# Patient Record
(demographics unavailable — no encounter records)

---

## 2024-12-03 NOTE — END OF VISIT
[FreeTextEntry3] : I personally saw and examined BENJA RODRIGES  in detail. I spoke to MICHELE Felix regarding the assessment and plan of care. I performed the procedures and relevant physical exam. I have made changes to the body of the note wherever necessary and appropriate.

## 2024-12-03 NOTE — HISTORY OF PRESENT ILLNESS
[de-identified] : Mr. RODRIGES is a 17 year male here with dad, was elbowed yesterday during a soccer game, he did hear a crack and nose bled. he was seen at [facility] no imaging performed. he is congested on R side  prior nasal injury 2 years ago, told he has a nasal fracture on imaging but nose did not appear crooked at that time and he had no congestion  - s/p ORNF and septoplasty with bilateral splints 12/2/24

## 2024-12-03 NOTE — ASSESSMENT
[FreeTextEntry1] : Mr. RODRIGES s/p ORNF and septoplasty with bilateral splints 12/2/24 POD1, doing well.   - can start small squirts of nasal saline spray today and continue TID followed by peroxide cleaning and bacitracin - pt shown how to clean with q-tips and peroxide followed by bacitracin, written instruction provided - nasal tip dressing re applied, pt can stop using once no longer necessary - f/up next week for dressing and splint removal

## 2024-12-03 NOTE — PHYSICAL EXAM
[Normal] : normal appearance, well groomed, well nourished, and in no acute distress [de-identified] : external splint in place [de-identified] : bilateral bivalve splints

## 2024-12-11 NOTE — ASSESSMENT
[FreeTextEntry1] : Mr. RODRIGES s/p ORNF and septoplasty with bilateral splints 12/2/24  External and internal splints removed.  Septum appears intact.  Nasal cavities suctioned bilaterally syncopal episode after removing suture, BP 72/52 HR 56, 102/66 HR 59    - c/w nasal saline x 1 more week, can use larger squirts at this point - ok to wash face normally, recommend using astringents to clean skin of nose - no strenuous activity for 1 week, no soccer or  heavy lifting for 2 weeks, no glasses for 3 weeks - face guard for contact sports for a total of 3 months post surgery - may blow your nose in another week - ok to dc peroxide cleanings. - f/u 3 weeks

## 2024-12-11 NOTE — HISTORY OF PRESENT ILLNESS
[de-identified] : Mr. RODRIGES is a 17 year male here with dad, was elbowed yesterday during a soccer game, he did hear a crack and nose bled. he was seen at [facility] no imaging performed. he is congested on R side  prior nasal injury 2 years ago, told he has a nasal fracture on imaging but nose did not appear crooked at that time and he had no congestion  - s/p ORNF and septoplasty with bilateral splints 12/2/24

## 2024-12-11 NOTE — PHYSICAL EXAM
[Normal] : normal appearance, well groomed, well nourished, and in no acute distress [de-identified] : external splint removed [de-identified] : bilateral bivalve splints removed

## 2025-01-03 NOTE — ASSESSMENT
[FreeTextEntry1] : Mr. RODRIGES s/p ORNF and septoplasty with bilateral splints 12/2/24 . Breathing 50% better.  Slight re-deviation to the left seen today, will monitor.  - f/up 2 months  - photos next visit.

## 2025-01-03 NOTE — HISTORY OF PRESENT ILLNESS
[de-identified] : - s/p ORNF and septoplasty with bilateral splints 12/2/24 Breathing better than before surgery- 50% better than before surgery, no issues.

## 2025-06-19 NOTE — PHYSICAL EXAM
[Normal] : no abnormal secretions [] : septum deviated to the left [de-identified] : septum is very slightly left of midline

## 2025-06-19 NOTE — ASSESSMENT
[FreeTextEntry1] : Mr. RODRIGES s/p ORNF and septoplasty with bilateral splints 12/2/24. Breathing 100% better.  mild septal deviation left, but now breathing well.   - f/up 6 months  - photos next visit.

## 2025-06-19 NOTE — HISTORY OF PRESENT ILLNESS
[de-identified] : - s/p ORNF and septoplasty with bilateral splints 12/2/24 - at 3 months 50% better than before surgery, no issues [FreeTextEntry1] : pt here for 6 month f/u breathing 100% improved since surgery not using any sprays Feels he is happy with appearance  very happy with appearance